# Patient Record
Sex: MALE | Race: WHITE | NOT HISPANIC OR LATINO | Employment: FULL TIME | URBAN - METROPOLITAN AREA
[De-identification: names, ages, dates, MRNs, and addresses within clinical notes are randomized per-mention and may not be internally consistent; named-entity substitution may affect disease eponyms.]

---

## 2023-10-03 ENCOUNTER — OFFICE VISIT (OUTPATIENT)
Dept: GASTROENTEROLOGY | Facility: CLINIC | Age: 53
End: 2023-10-03
Payer: COMMERCIAL

## 2023-10-03 VITALS
HEART RATE: 72 BPM | BODY MASS INDEX: 40.05 KG/M2 | TEMPERATURE: 96.8 F | DIASTOLIC BLOOD PRESSURE: 87 MMHG | WEIGHT: 204 LBS | SYSTOLIC BLOOD PRESSURE: 138 MMHG | OXYGEN SATURATION: 94 % | HEIGHT: 60 IN

## 2023-10-03 DIAGNOSIS — K59.09 OTHER CONSTIPATION: Primary | ICD-10-CM

## 2023-10-03 PROCEDURE — 99203 OFFICE O/P NEW LOW 30 MIN: CPT | Performed by: INTERNAL MEDICINE

## 2023-10-03 RX ORDER — BLOOD SUGAR DIAGNOSTIC
STRIP MISCELLANEOUS
COMMUNITY
Start: 2023-09-18

## 2023-10-03 RX ORDER — INSULIN GLARGINE 100 [IU]/ML
INJECTION, SOLUTION SUBCUTANEOUS
COMMUNITY

## 2023-10-03 RX ORDER — MELOXICAM 15 MG/1
TABLET ORAL
COMMUNITY

## 2023-10-03 RX ORDER — PLECANATIDE 3 MG/1
TABLET ORAL
COMMUNITY

## 2023-10-03 RX ORDER — EMPAGLIFLOZIN 25 MG/1
1 TABLET, FILM COATED ORAL DAILY
COMMUNITY

## 2023-10-03 RX ORDER — CIPROFLOXACIN 500 MG/1
TABLET, FILM COATED ORAL
COMMUNITY

## 2023-10-03 RX ORDER — LISINOPRIL 10 MG/1
1 TABLET ORAL DAILY
COMMUNITY

## 2023-10-03 RX ORDER — FAMCICLOVIR 250 MG/1
1 TABLET ORAL DAILY
COMMUNITY

## 2023-10-03 RX ORDER — SIMVASTATIN 40 MG
1 TABLET ORAL
COMMUNITY

## 2023-10-03 RX ORDER — SEMAGLUTIDE 1.34 MG/ML
INJECTION, SOLUTION SUBCUTANEOUS
COMMUNITY

## 2023-10-03 NOTE — PROGRESS NOTES
Chief Complaint   Patient presents with   • Constipation   • Abdominal Pain     Jhony Amaro is a 53 y.o. male who presents with a history of acute diarrhea followed by severe constipation  HPI    Patient 53-year-old male with history of diabetes, hyperlipidemia, hypertension who developed acute onset diarrhea while traveling in [facility].  Patient reports symptoms lasted for nearly a month but when they resolved patient was left with constipation almost to the point of obstruction.  Patient reports was given Linzess with caused passage amount of diarrhea which actually improved his obstruction but every time he took it he just ended up with diarrhea.  Patient was then tried on Trulance with the same results.  Eventually patient stopped taking the medication and started taking Metamucil which over the last several months actually seems to have improved his symptoms.  Patient does report though when he travels recently to [facility] his constipation returns now here for further recommendations.    History reviewed. No pertinent past medical history.    Current Outpatient Medications:   •  Accu-Chek Guide test strip, , Disp: , Rfl:   •  ciprofloxacin (CIPRO) 500 MG tablet, , Disp: , Rfl:   •  famciclovir (FAMVIR) 250 MG tablet, Take 1 tablet by mouth Daily., Disp: , Rfl:   •  Jardiance 25 MG tablet tablet, Take 1 tablet by mouth Daily., Disp: , Rfl:   •  Lantus SoloStar 100 UNIT/ML injection pen, INJECT 50 UNITS UNDER THE SKIN DAILY AS DIRECTED, Disp: , Rfl:   •  lisinopril (PRINIVIL,ZESTRIL) 10 MG tablet, Take 1 tablet by mouth Daily., Disp: , Rfl:   •  metFORMIN (GLUCOPHAGE) 1000 MG tablet, Take 1 tablet by mouth 2 (Two) Times a Day., Disp: , Rfl:   •  Ozempic, 1 MG/DOSE, 4 MG/3ML solution pen-injector, DIAL AND INJECT UNDER THE SKIN 1MG WEEKLY, Disp: , Rfl:   •  simvastatin (ZOCOR) 40 MG tablet, Take 1 tablet by mouth every night at bedtime., Disp: , Rfl:   •  meloxicam (MOBIC) 15 MG tablet, Take 1 tablet every day  by oral route for 30 days. (Patient not taking: Reported on 10/3/2023), Disp: , Rfl:   •  Trulance 3 MG tablet, Take by oral route for 30 days. (Patient not taking: Reported on 10/3/2023), Disp: , Rfl:   No Known Allergies  Social History     Socioeconomic History   • Marital status:    Tobacco Use   • Smoking status: Never   • Smokeless tobacco: Never   Substance and Sexual Activity   • Alcohol use: Yes     Alcohol/week: 1.0 standard drink     Types: 1 Cans of beer per week   • Drug use: Never     Family History   Problem Relation Age of Onset   • Alcohol abuse Mother    • Diabetes Father    • Breast cancer Maternal Aunt    • Dementia Maternal Uncle      Review of Systems   Constitutional: Negative.    HENT: Negative.     Eyes: Negative.    Respiratory: Negative.     Cardiovascular: Negative.    Gastrointestinal: Negative.    Endocrine: Negative.    Musculoskeletal: Negative.    Skin: Negative.    Allergic/Immunologic: Negative.    Hematological: Negative.    Vitals:    10/03/23 1152   BP: 138/87   Pulse: 72   Temp: 96.8 °F (36 °C)   SpO2: 94%     Physical Exam  Vitals and nursing note reviewed.   Constitutional:       Appearance: Normal appearance. He is well-developed.   HENT:      Head: Normocephalic and atraumatic.   Eyes:      General: No scleral icterus.     Conjunctiva/sclera: Conjunctivae normal.   Neck:      Trachea: No tracheal deviation.   Cardiovascular:      Rate and Rhythm: Normal rate and regular rhythm.      Heart sounds: Normal heart sounds. No murmur heard.    No friction rub. No gallop.   Pulmonary:      Effort: Pulmonary effort is normal. No respiratory distress.      Breath sounds: Normal breath sounds.   Abdominal:      General: Bowel sounds are normal. There is no distension.      Palpations: Abdomen is soft. There is no mass.      Tenderness: There is no abdominal tenderness. There is no guarding or rebound.   Musculoskeletal:         General: No swelling or tenderness. Normal range  of motion.      Cervical back: Normal range of motion and neck supple. No rigidity.   Skin:     General: Skin is warm and dry.      Coloration: Skin is not jaundiced.      Findings: No rash.   Neurological:      General: No focal deficit present.      Mental Status: He is alert and oriented to person, place, and time.      Cranial Nerves: No cranial nerve deficit.   Psychiatric:         Behavior: Behavior normal.         Thought Content: Thought content normal.         Judgment: Judgment normal.   Diagnoses and all orders for this visit:    Other constipation    Other orders  -     ciprofloxacin (CIPRO) 500 MG tablet  -     Jardiance 25 MG tablet tablet; Take 1 tablet by mouth Daily.  -     famciclovir (FAMVIR) 250 MG tablet; Take 1 tablet by mouth Daily.  -     Accu-Chek Guide test strip  -     Lantus SoloStar 100 UNIT/ML injection pen; INJECT 50 UNITS UNDER THE SKIN DAILY AS DIRECTED  -     lisinopril (PRINIVIL,ZESTRIL) 10 MG tablet; Take 1 tablet by mouth Daily.  -     meloxicam (MOBIC) 15 MG tablet; Take 1 tablet every day by oral route for 30 days. (Patient not taking: Reported on 10/3/2023)  -     Trulance 3 MG tablet; Take by oral route for 30 days. (Patient not taking: Reported on 10/3/2023)  -     Ozempic, 1 MG/DOSE, 4 MG/3ML solution pen-injector; DIAL AND INJECT UNDER THE SKIN 1MG WEEKLY  -     metFORMIN (GLUCOPHAGE) 1000 MG tablet; Take 1 tablet by mouth 2 (Two) Times a Day.  -     simvastatin (ZOCOR) 40 MG tablet; Take 1 tablet by mouth every night at bedtime.    Patient 53-year-old male with history of hypertension, diabetes, hyperlipidemia reports traveling overseas last May developed months of acute diarrhea.  Once the diarrhea resolved patient reports became severely constipated almost impacted.  Since that time patient has tried several medications including Trulance and Linzess which basically resulted in clearance of the constipation only to give him diarrhea again.  Patient reports the last  several months he started on fiber supplements and his symptoms have resolved.  Patient denies any abdominal pain no constipation having regular bowel movements here for further recommendations.  Patient does report he did travel to [place] that does seem to cause recurrent constipation and did recommend patient give trial of probiotics for travel see if that will help maintain his bacterial jaz we will follow-up clinically.  Patient's status post negative Cologuard in 2022 is not due for colonoscopy screening.  Would recommend family doctor consider thyroid testing to confirm thyroid is not part of the picture.  No labs or physician notes were sent with the referral.